# Patient Record
Sex: MALE | Race: WHITE | NOT HISPANIC OR LATINO | URBAN - METROPOLITAN AREA
[De-identification: names, ages, dates, MRNs, and addresses within clinical notes are randomized per-mention and may not be internally consistent; named-entity substitution may affect disease eponyms.]

---

## 2019-02-02 ENCOUNTER — INPATIENT (INPATIENT)
Facility: HOSPITAL | Age: 34
LOS: 0 days | Discharge: HOME | End: 2019-02-03
Attending: SURGERY | Admitting: SURGERY
Payer: COMMERCIAL

## 2019-02-02 VITALS
TEMPERATURE: 98 F | SYSTOLIC BLOOD PRESSURE: 141 MMHG | HEART RATE: 78 BPM | DIASTOLIC BLOOD PRESSURE: 91 MMHG | OXYGEN SATURATION: 96 % | RESPIRATION RATE: 18 BRPM

## 2019-02-02 LAB
ALBUMIN SERPL ELPH-MCNC: 4.3 G/DL — SIGNIFICANT CHANGE UP (ref 3.5–5.2)
ALLERGY+IMMUNOLOGY DIAG STUDY NOTE: SIGNIFICANT CHANGE UP
ALP SERPL-CCNC: 66 U/L — SIGNIFICANT CHANGE UP (ref 30–115)
ALT FLD-CCNC: 50 U/L — HIGH (ref 0–41)
ANION GAP SERPL CALC-SCNC: 15 MMOL/L — HIGH (ref 7–14)
APTT BLD: 32.7 SEC — SIGNIFICANT CHANGE UP (ref 27–39.2)
AST SERPL-CCNC: 38 U/L — SIGNIFICANT CHANGE UP (ref 0–41)
BASOPHILS # BLD AUTO: 0.06 K/UL — SIGNIFICANT CHANGE UP (ref 0–0.2)
BASOPHILS NFR BLD AUTO: 0.4 % — SIGNIFICANT CHANGE UP (ref 0–1)
BILIRUB SERPL-MCNC: 0.3 MG/DL — SIGNIFICANT CHANGE UP (ref 0.2–1.2)
BUN SERPL-MCNC: 23 MG/DL — HIGH (ref 10–20)
CALCIUM SERPL-MCNC: 9.4 MG/DL — SIGNIFICANT CHANGE UP (ref 8.5–10.1)
CHLORIDE SERPL-SCNC: 95 MMOL/L — LOW (ref 98–110)
CO2 SERPL-SCNC: 26 MMOL/L — SIGNIFICANT CHANGE UP (ref 17–32)
CREAT SERPL-MCNC: 1.4 MG/DL — SIGNIFICANT CHANGE UP (ref 0.7–1.5)
EOSINOPHIL # BLD AUTO: 0.26 K/UL — SIGNIFICANT CHANGE UP (ref 0–0.7)
EOSINOPHIL NFR BLD AUTO: 1.6 % — SIGNIFICANT CHANGE UP (ref 0–8)
GLUCOSE SERPL-MCNC: 139 MG/DL — HIGH (ref 70–99)
HCT VFR BLD CALC: 49.7 % — SIGNIFICANT CHANGE UP (ref 42–52)
HGB BLD-MCNC: 16.5 G/DL — SIGNIFICANT CHANGE UP (ref 14–18)
IMM GRANULOCYTES NFR BLD AUTO: 0.5 % — HIGH (ref 0.1–0.3)
INR BLD: 1.12 RATIO — SIGNIFICANT CHANGE UP (ref 0.65–1.3)
LIDOCAIN IGE QN: 25 U/L — SIGNIFICANT CHANGE UP (ref 7–60)
LYMPHOCYTES # BLD AUTO: 18.7 % — LOW (ref 20.5–51.1)
LYMPHOCYTES # BLD AUTO: 2.99 K/UL — SIGNIFICANT CHANGE UP (ref 1.2–3.4)
MCHC RBC-ENTMCNC: 28 PG — SIGNIFICANT CHANGE UP (ref 27–31)
MCHC RBC-ENTMCNC: 33.2 G/DL — SIGNIFICANT CHANGE UP (ref 32–37)
MCV RBC AUTO: 84.4 FL — SIGNIFICANT CHANGE UP (ref 80–94)
MONOCYTES # BLD AUTO: 0.88 K/UL — HIGH (ref 0.1–0.6)
MONOCYTES NFR BLD AUTO: 5.5 % — SIGNIFICANT CHANGE UP (ref 1.7–9.3)
NEUTROPHILS # BLD AUTO: 11.73 K/UL — HIGH (ref 1.4–6.5)
NEUTROPHILS NFR BLD AUTO: 73.3 % — SIGNIFICANT CHANGE UP (ref 42.2–75.2)
NRBC # BLD: 0 /100 WBCS — SIGNIFICANT CHANGE UP (ref 0–0)
PLATELET # BLD AUTO: 302 K/UL — SIGNIFICANT CHANGE UP (ref 130–400)
POTASSIUM SERPL-MCNC: 4.2 MMOL/L — SIGNIFICANT CHANGE UP (ref 3.5–5)
POTASSIUM SERPL-SCNC: 4.2 MMOL/L — SIGNIFICANT CHANGE UP (ref 3.5–5)
PROT SERPL-MCNC: 7 G/DL — SIGNIFICANT CHANGE UP (ref 6–8)
PROTHROM AB SERPL-ACNC: 12.9 SEC — HIGH (ref 9.95–12.87)
RBC # BLD: 5.89 M/UL — SIGNIFICANT CHANGE UP (ref 4.7–6.1)
RBC # FLD: 14.6 % — HIGH (ref 11.5–14.5)
SODIUM SERPL-SCNC: 136 MMOL/L — SIGNIFICANT CHANGE UP (ref 135–146)
TYPE + AB SCN PNL BLD: SIGNIFICANT CHANGE UP
WBC # BLD: 16 K/UL — HIGH (ref 4.8–10.8)
WBC # FLD AUTO: 16 K/UL — HIGH (ref 4.8–10.8)

## 2019-02-02 RX ORDER — BUPROPION HYDROCHLORIDE 150 MG/1
1 TABLET, EXTENDED RELEASE ORAL
Qty: 0 | Refills: 0 | COMMUNITY

## 2019-02-02 RX ORDER — IOHEXOL 300 MG/ML
30 INJECTION, SOLUTION INTRAVENOUS ONCE
Qty: 0 | Refills: 0 | Status: COMPLETED | OUTPATIENT
Start: 2019-02-02 | End: 2019-02-02

## 2019-02-02 RX ORDER — ESCITALOPRAM OXALATE 10 MG/1
1 TABLET, FILM COATED ORAL
Qty: 0 | Refills: 0 | COMMUNITY

## 2019-02-02 RX ORDER — BUPROPION HYDROCHLORIDE 150 MG/1
0 TABLET, EXTENDED RELEASE ORAL
Qty: 30 | Refills: 0 | COMMUNITY

## 2019-02-02 RX ORDER — CEFOTETAN DISODIUM 1 G
1 VIAL (EA) INJECTION ONCE
Qty: 0 | Refills: 0 | Status: COMPLETED | OUTPATIENT
Start: 2019-02-02 | End: 2019-02-02

## 2019-02-02 RX ORDER — ESCITALOPRAM OXALATE 10 MG/1
0 TABLET, FILM COATED ORAL
Qty: 30 | Refills: 0 | COMMUNITY

## 2019-02-02 RX ADMIN — Medication 100 GRAM(S): at 22:57

## 2019-02-02 RX ADMIN — IOHEXOL 30 MILLILITER(S): 300 INJECTION, SOLUTION INTRAVENOUS at 18:46

## 2019-02-02 NOTE — H&P ADULT - HISTORY OF PRESENT ILLNESS
32 y/o M no PMHx on Lexapro and Wellbutrin presents with abdominal pain starting 5PM yesterday. Pt reports pain was initially generalized, but now localized to RLQ. Pt reports nausea secondary to pain. Last BM 2PM today. Decreased appetite, normal PO intake. Pt has been taking Aleve with minimal relief.  No vomiting. No fever.  No urinary SX. 34 y/o M no PMHx of anxiety on Lexapro and Wellbutrin presents with abdominal pain starting 5PM yesterday. Pt reports pain was initially generalized, but now localized to RLQ. Pt reports nausea secondary to pain. Last BM 2PM today. Decreased appetite, normal PO intake. Pt has been taking Aleve with minimal relief.  No vomiting. No fever.  No urinary SX. 32 y/o male with PMHx of anxiety presents to ED c/o of abdominal pain x 1day. States that the pain was initially diffuse and then went to the RLQ. Denies any fever, chills, nausea, vomiting, diarrhea. States that last time he ate was 8 hours ago. Last BM 2PM today.

## 2019-02-02 NOTE — H&P ADULT - ATTENDING COMMENTS
Patient seen and examined in preoperative area  and discussed management plans with patient and explained..

## 2019-02-02 NOTE — H&P ADULT - ASSESSMENT
34 y/o male with PMHx of anxiety presents to ED c/o of abdominal pain x 1day. State that the pain was initially diffuse and then went to the RLQ. Deinies any fever, chills, nausea, vomiting, diarrhea. States that last time he ate was 8 hours ago.   On physical exam there is RLQ tenderness. WBC 16. CT abdomen shows Dilated appendix to 1.7 cm    Plan:   NPO, IVF,   Cefotetan  OR for lap appendectomy  DVT/GI prophylaxis 34 y/o male with PMHx of anxiety presents to ED c/o of abdominal pain x 1day. State that the pain was initially diffuse and then went to the RLQ. Denies any fever, chills, nausea, vomiting, diarrhea. States that last time he ate was 8 hours ago.   On physical exam there is RLQ tenderness. WBC 16. CT abdomen shows Dilated appendix to 1.7 cm    Plan:   NPO, IVF,   Cefotetan  OR for lap appendectomy  DVT / GI prophylaxis      Senior Resident Note  Pt seen and examined, RLQ pain, tenderness, rebound no guarding WBC 16, acute appendicitis on CT scan.   OR for lap appendectomy  Above note has been reviewed and edited  Plan d/w patient and Dr. Brasher 32 y/o male with PMHx of anxiety presents to ED c/o of abdominal pain x 1day. State that the pain was initially diffuse and then went to the RLQ. Denies any fever, chills, nausea, vomiting, diarrhea. States that last time he ate was 8 hours ago.   On physical exam there is RLQ tenderness. WBC 16. CT abdomen shows Dilated appendix to 1.7 cm consistent with acute appendicitis    Plan:   NPO, IVF,   Cefotetan  OR for lap appendectomy  DVT / GI prophylaxis      Senior Resident Note  Pt seen and examined, RLQ pain, tenderness, rebound no guarding WBC 16, acute appendicitis on CT scan.   OR for lap appendectomy  Above note has been reviewed and edited  Plan d/w patient and Dr. Brasher

## 2019-02-02 NOTE — H&P ADULT - NSHPPHYSICALEXAM_GEN_ALL_CORE
PHYSICAL EXAM:  GENERAL: NAD, well-developed  EYES: conjunctiva and sclera clear  CHEST/LUNG: Clear to auscultation bilaterally;   HEART: Regular rate and rhythm;   ABDOMEN: RLQ abdominal tenderness. Soft, Nondistended;   EXTREMITIES: No clubbing, cyanosis, or edema  PSYCH: AAOx3  NEUROLOGY: non-focal deficits  SKIN: No rashes or lesions Vitals:   T(C): 36.8 (02-02-19 @ 18:03), Max: 36.8 (02-02-19 @ 18:03)  HR: 87 (02-02-19 @ 18:03) (78 - 87)  BP: 141/92 (02-02-19 @ 18:03) (141/91 - 141/92)  RR: 20 (02-02-19 @ 18:03) (18 - 20)  SpO2: 95% (02-02-19 @ 18:03) (95% - 96%)    Height (cm): 162.56 (02-02 @ 16:37)  Weight (kg): 79.4 (02-02 @ 16:37)  BMI (kg/m2): 30 (02-02 @ 16:37)  BSA (m2): 1.85 (02-02 @ 16:37)    PHYSICAL EXAM:  GENERAL: NAD, well-developed  EYES: conjunctiva and sclera clear  CHEST/LUNG: Clear to auscultation bilaterally;   HEART: Regular rate and rhythm;   ABDOMEN: RLQ abdominal tenderness. Soft, Nondistended;   EXTREMITIES: No clubbing, cyanosis, or edema  PSYCH: AAOx3  NEUROLOGY: non-focal deficits  SKIN: No rashes or lesions

## 2019-02-02 NOTE — ED PROVIDER NOTE - OBJECTIVE STATEMENT
32 y/o M no PMHx on Lexapro and Wellbutrin presents with abdominal pain starting 5PM yesterday. [facility] and was sent to ED to r/o appendicitis. Pt reports pain was initially generalized, but now localized to RLQ. Pt had kidney stone in past but reports pain feels different. Pt reports nausea secondary to pain. Last BM 2PM today. Decreased appetite, normal PO intake. Pt has been taking Aleve with minimal relief.  No vomiting. No fever.  No urinary SX.

## 2019-02-02 NOTE — H&P ADULT - NSHPLABSRESULTS_GEN_ALL_CORE
LABS  CBC (02-02 @ 18:35)                              16.5                           16.00<H>  )----------------(  302        73.3  % Neutrophils, 18.7<L>% Lymphocytes, ANC: 11.73<H>                              49.7      BMP (02-02 @ 18:35)             136     |  95<L>   |  23<H> 		Ca++ --      Ca 9.4                ---------------------------------( 139<H>		Mg --                 4.2     |  26      |  1.4   			Ph --        LFTs (02-02 @ 18:35)      TPro 7.0 / Alb 4.3 / TBili 0.3 / DBili -- / AST 38 / ALT 50<H> / AlkPhos 66    Coags (02-02 @ 18:35)  aPTT 32.7 / INR 1.12 / PT 12.90<H>    IMAGING:   CT Abdomen and Pelvis w/ Oral Cont and w/ IV Cont (02.02.19 @ 22:09) >  IMPRESSION:   Acute appendicitis without evidence for perforation or abscess formation.

## 2019-02-02 NOTE — ED PROVIDER NOTE - PHYSICAL EXAMINATION
VITAL SIGNS: I have reviewed nursing notes and confirm.  CONSTITUTIONAL: Well-developed; well-nourished; in no acute distress.  SKIN: Skin exam is warm and dry, no acute rash.  HEAD: Normocephalic; atraumatic.  EYES: PERRL, EOM intact; conjunctiva and sclera clear.  ENT: No nasal discharge; airway clear. TMs clear.  NECK: Supple; non tender.  CARD: S1, S2 normal; no murmurs, gallops, or rubs. Regular rate and rhythm.  RESP: No wheezes, rales or rhonchi.  ABD: (+) tenderness RLQ. No rebound or guarding. No flank tenderness   EXT: Normal ROM. No clubbing, cyanosis or edema.  LYMPH: No acute cervical adenopathy.  NEURO: Alert, oriented. Grossly unremarkable. No focal deficits.  PSYCH: Cooperative, appropriate.

## 2019-02-02 NOTE — ED ADULT NURSE NOTE - OBJECTIVE STATEMENT
patient c/o generalized abdominal pain that started yesterday and today the pain moved to his RLQ. went to an urgent care that told him to come to ED to r/o appendicitis. denies n/v/d/fevers/chills. states he took a lot of Aleve today for the pain but it did not help

## 2019-02-03 ENCOUNTER — TRANSCRIPTION ENCOUNTER (OUTPATIENT)
Age: 34
End: 2019-02-03

## 2019-02-03 VITALS
TEMPERATURE: 98 F | RESPIRATION RATE: 19 BRPM | DIASTOLIC BLOOD PRESSURE: 58 MMHG | SYSTOLIC BLOOD PRESSURE: 121 MMHG | HEART RATE: 98 BPM | OXYGEN SATURATION: 98 %

## 2019-02-03 PROCEDURE — 44970 LAPAROSCOPY APPENDECTOMY: CPT

## 2019-02-03 PROCEDURE — 99222 1ST HOSP IP/OBS MODERATE 55: CPT | Mod: 57

## 2019-02-03 RX ORDER — CHLORHEXIDINE GLUCONATE 213 G/1000ML
1 SOLUTION TOPICAL
Qty: 0 | Refills: 0 | Status: DISCONTINUED | OUTPATIENT
Start: 2019-02-03 | End: 2019-02-03

## 2019-02-03 RX ORDER — ACETAMINOPHEN 500 MG
2 TABLET ORAL
Qty: 0 | Refills: 0 | COMMUNITY
Start: 2019-02-03

## 2019-02-03 RX ORDER — SODIUM CHLORIDE 9 MG/ML
1000 INJECTION INTRAMUSCULAR; INTRAVENOUS; SUBCUTANEOUS
Qty: 0 | Refills: 0 | Status: DISCONTINUED | OUTPATIENT
Start: 2019-02-03 | End: 2019-02-03

## 2019-02-03 RX ORDER — HYDROMORPHONE HYDROCHLORIDE 2 MG/ML
1 INJECTION INTRAMUSCULAR; INTRAVENOUS; SUBCUTANEOUS
Qty: 0 | Refills: 0 | Status: DISCONTINUED | OUTPATIENT
Start: 2019-02-03 | End: 2019-02-03

## 2019-02-03 RX ORDER — IBUPROFEN 200 MG
1 TABLET ORAL
Qty: 0 | Refills: 0 | COMMUNITY
Start: 2019-02-03

## 2019-02-03 RX ORDER — MORPHINE SULFATE 50 MG/1
2 CAPSULE, EXTENDED RELEASE ORAL EVERY 4 HOURS
Qty: 0 | Refills: 0 | Status: DISCONTINUED | OUTPATIENT
Start: 2019-02-03 | End: 2019-02-03

## 2019-02-03 RX ORDER — MORPHINE SULFATE 50 MG/1
2 CAPSULE, EXTENDED RELEASE ORAL EVERY 6 HOURS
Qty: 0 | Refills: 0 | Status: DISCONTINUED | OUTPATIENT
Start: 2019-02-03 | End: 2019-02-03

## 2019-02-03 RX ORDER — HEPARIN SODIUM 5000 [USP'U]/ML
5000 INJECTION INTRAVENOUS; SUBCUTANEOUS EVERY 8 HOURS
Qty: 0 | Refills: 0 | Status: DISCONTINUED | OUTPATIENT
Start: 2019-02-03 | End: 2019-02-03

## 2019-02-03 RX ORDER — SODIUM CHLORIDE 9 MG/ML
1000 INJECTION, SOLUTION INTRAVENOUS
Qty: 0 | Refills: 0 | Status: CANCELLED | OUTPATIENT
Start: 2019-02-03 | End: 2019-02-03

## 2019-02-03 RX ORDER — CEFOTETAN DISODIUM 1 G
2 VIAL (EA) INJECTION EVERY 12 HOURS
Qty: 0 | Refills: 0 | Status: DISCONTINUED | OUTPATIENT
Start: 2019-02-03 | End: 2019-02-03

## 2019-02-03 RX ORDER — BUPROPION HYDROCHLORIDE 150 MG/1
300 TABLET, EXTENDED RELEASE ORAL DAILY
Qty: 0 | Refills: 0 | Status: DISCONTINUED | OUTPATIENT
Start: 2019-02-03 | End: 2019-02-03

## 2019-02-03 RX ORDER — ESCITALOPRAM OXALATE 10 MG/1
10 TABLET, FILM COATED ORAL DAILY
Qty: 0 | Refills: 0 | Status: DISCONTINUED | OUTPATIENT
Start: 2019-02-03 | End: 2019-02-03

## 2019-02-03 RX ORDER — PANTOPRAZOLE SODIUM 20 MG/1
40 TABLET, DELAYED RELEASE ORAL
Qty: 0 | Refills: 0 | Status: DISCONTINUED | OUTPATIENT
Start: 2019-02-03 | End: 2019-02-03

## 2019-02-03 RX ORDER — IBUPROFEN 200 MG
600 TABLET ORAL EVERY 6 HOURS
Qty: 0 | Refills: 0 | Status: DISCONTINUED | OUTPATIENT
Start: 2019-02-03 | End: 2019-02-03

## 2019-02-03 RX ORDER — HYDROMORPHONE HYDROCHLORIDE 2 MG/ML
0.5 INJECTION INTRAMUSCULAR; INTRAVENOUS; SUBCUTANEOUS
Qty: 0 | Refills: 0 | Status: DISCONTINUED | OUTPATIENT
Start: 2019-02-03 | End: 2019-02-03

## 2019-02-03 RX ORDER — ACETAMINOPHEN 500 MG
650 TABLET ORAL EVERY 6 HOURS
Qty: 0 | Refills: 0 | Status: DISCONTINUED | OUTPATIENT
Start: 2019-02-03 | End: 2019-02-03

## 2019-02-03 RX ORDER — OXYCODONE HYDROCHLORIDE 5 MG/1
1 TABLET ORAL
Qty: 5 | Refills: 0 | OUTPATIENT
Start: 2019-02-03

## 2019-02-03 RX ADMIN — SODIUM CHLORIDE 100 MILLILITER(S): 9 INJECTION INTRAMUSCULAR; INTRAVENOUS; SUBCUTANEOUS at 00:53

## 2019-02-03 RX ADMIN — Medication 600 MILLIGRAM(S): at 11:55

## 2019-02-03 RX ADMIN — SODIUM CHLORIDE 100 MILLILITER(S): 9 INJECTION INTRAMUSCULAR; INTRAVENOUS; SUBCUTANEOUS at 07:45

## 2019-02-03 NOTE — BRIEF OPERATIVE NOTE - POST-OP DX
Acute appendicitis with localized peritonitis, without perforation, abscess, or gangrene  02/03/2019    Active  Beatrice Brasher

## 2019-02-03 NOTE — PROGRESS NOTE ADULT - SUBJECTIVE AND OBJECTIVE BOX
BRANDON BEHRING  33y Male   [id number]    Procedure/dx: acute appendicitis  Events of the Last 24h: admitted with acute appendicitis    Patient is a 33y old  Male who presents with a chief complaint of acute appendicitis (02 Feb 2019 23:38)    PAST MEDICAL & SURGICAL HISTORY:  Anxiety  No significant past surgical history    Vital Signs Last 24 Hrs  T(C): 36.8 (03 Feb 2019 03:07), Max: 36.8 (02 Feb 2019 18:03)  T(F): 98.2 (03 Feb 2019 03:07), Max: 98.2 (02 Feb 2019 18:03)  HR: 78 (03 Feb 2019 03:07) (78 - 87)  BP: 124/67 (03 Feb 2019 03:07) (124/67 - 145/87)  RR: 18 (03 Feb 2019 03:07) (18 - 20)  SpO2: 96% (03 Feb 2019 03:07) (95% - 96%)    Diet, NPO:   Except Medications (02-03-19 @ 00:45)  I&O's Detail    MEDICATIONS  (STANDING):  buPROPion  milliGRAM(s) Oral daily  chlorhexidine 4% Liquid 1 Application(s) Topical <User Schedule>  escitalopram 10 milliGRAM(s) Oral daily  heparin  Injectable 5000 Unit(s) SubCutaneous every 8 hours  pantoprazole    Tablet 40 milliGRAM(s) Oral before breakfast  sodium chloride 0.9%. 1000 milliLiter(s) (100 mL/Hr) IV Continuous <Continuous>    MEDICATIONS  (PRN):  morphine  - Injectable 2 milliGRAM(s) IV Push every 6 hours PRN Moderate Pain (4 - 6)    PHYSICAL EXAM:  GENERAL: NAD, well-developed  	EYES: conjunctiva and sclera clear  	CHEST/LUNG: Clear to auscultation bilaterally;   	HEART: Regular rate and rhythm;   	ABDOMEN: RLQ abdominal tenderness. Soft, Nondistended;   	EXTREMITIES: No clubbing, cyanosis, or edema  	PSYCH: AAOx3  	NEUROLOGY: non-focal deficits  SKIN: No rashes or lesions    LABS:                    16.5   16.00 )-----------( 302      ( 02 Feb 2019 18:35 )             49.7        02-02    136  |  95<L>  |  23<H>  ----------------------------<  139<H>  4.2   |  26  |  1.4    Ca    9.4      02 Feb 2019 18:35    TPro  7.0  /  Alb  4.3  /  TBili  0.3  /  DBili  x   /  AST  38  /  ALT  50<H>  /  AlkPhos  66  02-02    LIVER FUNCTIONS - ( 02 Feb 2019 18:35 )  Alb: 4.3 g/dL / Pro: 7.0 g/dL / ALK PHOS: 66 U/L / ALT: 50 U/L / AST: 38 U/L / GGT: x           PT/INR - ( 02 Feb 2019 18:35 )   PT: 12.90 sec;   INR: 1.12 ratio         PTT - ( 02 Feb 2019 18:35 )  PTT:32.7 sec    IMAGING:  CT Abdomen and Pelvis w/ Oral Cont and w/ IV Cont (02.02.19 @ 22:09) >  IMPRESSION:   Acute appendicitis without evidence for perforation or abscess formation.

## 2019-02-03 NOTE — CHART NOTE - NSCHARTNOTEFT_GEN_A_CORE
PACU ANESTHESIA ADMISSION NOTE      Procedure:   Post op diagnosis:  Acute appendicitis with localized peritonitis, without perforation, abscess, or gangrene      ____  Intubated  TV:______       Rate: ______      FiO2: ______    __x__  Patent Airway    __x__  Full return of protective reflexes    ____  Full recovery from anesthesia / back to baseline     Vitals:   T:  98.8F         R:    12              BP:   146/83              Sat: 98%                  P: 94      Mental Status:  _x___ Awake   _____ Alert   _____ Drowsy   _____ Sedated    Nausea/Vomiting:  __x__ NO  ______Yes,   See Post - Op Orders          Pain Scale (0-10):  ___0__    Treatment: ____ None    ____ See Post - Op/PCA Orders    Post - Operative Fluids:   ____ Oral   __x__ See Post - Op Orders    Plan: Discharge:   ____Home       __x___Floor     _____Critical Care    _____  Other:_________________    Comments: Pt awake, VS stable, no anesthesia complications.

## 2019-02-03 NOTE — PROGRESS NOTE ADULT - ASSESSMENT
32 y/o male with PMHx of anxiety presents to ED c/o of abdominal pain x 1day. State that the pain was initially diffuse and then went to the RLQ. Denies any fever, chills, nausea, vomiting, diarrhea. States that last time he ate was 8 hours ago.   On physical exam there is RLQ tenderness. WBC 16. CT abdomen shows Dilated appendix to 1.7 cm consistent with acute appendicitis    Plan:   NPO, IVF,   Cefotetan  OR for lap appendectomy  DVT / GI prophylaxis

## 2019-02-03 NOTE — DISCHARGE NOTE ADULT - PATIENT PORTAL LINK FT
You can access the [facility], offered by [facility], by registering with the following website: [email or web address]

## 2019-02-03 NOTE — DISCHARGE NOTE ADULT - MEDICATION SUMMARY - MEDICATIONS TO TAKE
I will START or STAY ON the medications listed below when I get home from the hospital:    acetaminophen 325 mg oral tablet  -- 2 tab(s) by mouth every 6 hours, As needed, Mild Pain (1 - 3), Moderate Pain (4 - 6)  -- Indication: For pain     ibuprofen 600 mg oral tablet  -- 1 tab(s) by mouth every 6 hours  -- Indication: For pain    oxyCODONE 5 mg oral tablet  -- 1 tab(s) by mouth every 6 hours, As Needed -for moderate pain MDD:MDD=4   -- Caution federal law prohibits the transfer of this drug to any person other  than the person for whom it was prescribed.  It is very important that you take or use this exactly as directed.  Do not skip doses or discontinue unless directed by your doctor.  May cause drowsiness.  Alcohol may intensify this effect.  Use care when operating dangerous machinery.  This prescription cannot be refilled.  Using more of this medication than prescribed may cause serious breathing problems.    -- Indication: For moderate pain    ESCITALOPRAM TAB 10MG  -- 1 tab(s) by mouth once a day  -- Indication: For Anxiety    BUPROPN HCL  TAB 300MG XL  -- 1 tab(s) by mouth once a day  -- Indication: For Anxiety

## 2019-02-03 NOTE — DISCHARGE NOTE ADULT - CARE PROVIDER_API CALL
Beatrice Brasher)  Surgery  [address] [place] [address]  Phone: [phone]  Fax: [phone]

## 2019-02-03 NOTE — DISCHARGE NOTE ADULT - CARE PLAN
Principal Discharge DX:	Appendicitis  Goal:	Complete recovery expected  Assessment and plan of treatment:	Please take tylenol 650 mg and ibuprofen 600 mg every 6 hours with meals for pain, you may take 1 tab oxycodone every 4-6 hours for severe pain, a prescription has been sent to your pharmacy.  Please do not drive if you are taking pain medication.  You may continue regular diet as tolerated.  You may shower, please keep dressings and wound clean and dry, please do no soak wound or dressings.  You may remove the dressings in 48 hours.  Please follow up with Dr. Brasher in 1-2 weeks, you may call [phone] to make an appointment.  Please refrain from heavy lifting or straining >15lbs for the next 4-6 weeks.  If you experience fever, chills, chest pain, shortness of breath, nausea, vomiting, worsening abdominal pain please return to the ED for further management.

## 2019-02-03 NOTE — DISCHARGE NOTE ADULT - HOSPITAL COURSE
34 y/o male with PMHx of anxiety presents to ED c/o of abdominal pain x 1day. States that the pain was initially diffuse and then went to the RLQ. Denies any fever, chills, nausea, vomiting, diarrhea. States that last time he ate was 8 hours ago. Last BM 2PM today.  CT A/P showed acute appendicitis without evidence of perforation or abscess.  Patient was taken to the OR for laparoscopic appendectomy, patient tolerated procedure well, remained stable throughout the procedure, was transferred to the PACU for further monitoring.  Patient remained stable in the PACU, patient was transferred to the floor for further monitoring.  Patient tolerated regular diet, was ambulating with no issues, pain was controlled, and patient voided with no issues.  Patient will follow up outpatient with Dr. Brasher in 1-2 weeks.

## 2019-02-03 NOTE — DISCHARGE NOTE ADULT - ADDITIONAL INSTRUCTIONS
Please take tylenol 650 mg and ibuprofen 600 mg every 6 hours with meals for pain, you may take 1 tab oxycodone every 4-6 hours for severe pain, a prescription has been sent to your pharmacy.  Please do not drive if you are taking pain medication.  You may continue regular diet as tolerated.  You may shower, please keep dressings and wound clean and dry, please do no soak wound or dressings.  You may remove the dressings in 48 hours.  Please follow up with Dr. Brasher in 1-2 weeks, you may call [phone] to make an appointment.  Please refrain from heavy lifting or straining >15lbs for the next 4-6 weeks.  If you experience fever, chills, chest pain, shortness of breath, nausea, vomiting, worsening abdominal pain please return to the ED for further management.

## 2019-02-03 NOTE — DISCHARGE NOTE ADULT - PLAN OF CARE
Complete recovery expected Please take tylenol 650 mg and ibuprofen 600 mg every 6 hours with meals for pain, you may take 1 tab oxycodone every 4-6 hours for severe pain, a prescription has been sent to your pharmacy.  Please do not drive if you are taking pain medication.  You may continue regular diet as tolerated.  You may shower, please keep dressings and wound clean and dry, please do no soak wound or dressings.  You may remove the dressings in 48 hours.  Please follow up with Dr. Brasher in 1-2 weeks, you may call [phone] to make an appointment.  Please refrain from heavy lifting or straining >15lbs for the next 4-6 weeks.  If you experience fever, chills, chest pain, shortness of breath, nausea, vomiting, worsening abdominal pain please return to the ED for further management.

## 2019-02-03 NOTE — BRIEF OPERATIVE NOTE - PROCEDURE
<<-----Click on this checkbox to enter Procedure Appendectomy, laparoscopic, adult  02/03/2019    Active  SRINI

## 2019-02-06 DIAGNOSIS — K35.80 UNSPECIFIED ACUTE APPENDICITIS: ICD-10-CM

## 2019-02-06 DIAGNOSIS — Z88.2 ALLERGY STATUS TO SULFONAMIDES: ICD-10-CM

## 2019-02-06 DIAGNOSIS — F41.9 ANXIETY DISORDER, UNSPECIFIED: ICD-10-CM

## 2019-02-06 DIAGNOSIS — R10.9 UNSPECIFIED ABDOMINAL PAIN: ICD-10-CM

## 2019-02-07 LAB — SURGICAL PATHOLOGY STUDY: SIGNIFICANT CHANGE UP

## 2019-02-11 PROBLEM — Z00.00 ENCOUNTER FOR PREVENTIVE HEALTH EXAMINATION: Status: ACTIVE | Noted: 2019-02-11

## 2019-02-11 PROBLEM — F41.9 ANXIETY DISORDER, UNSPECIFIED: Chronic | Status: ACTIVE | Noted: 2019-02-02

## 2019-02-19 ENCOUNTER — APPOINTMENT (OUTPATIENT)
Dept: SURGERY | Facility: CLINIC | Age: 34
End: 2019-02-19
Payer: COMMERCIAL

## 2019-02-19 VITALS
WEIGHT: 180 LBS | SYSTOLIC BLOOD PRESSURE: 120 MMHG | BODY MASS INDEX: 31.89 KG/M2 | DIASTOLIC BLOOD PRESSURE: 76 MMHG | HEIGHT: 63 IN

## 2019-02-19 DIAGNOSIS — F32.9 MAJOR DEPRESSIVE DISORDER, SINGLE EPISODE, UNSPECIFIED: ICD-10-CM

## 2019-02-19 DIAGNOSIS — Z78.9 OTHER SPECIFIED HEALTH STATUS: ICD-10-CM

## 2019-02-19 DIAGNOSIS — K35.80 UNSPECIFIED ACUTE APPENDICITIS: ICD-10-CM

## 2019-02-19 PROCEDURE — 99024 POSTOP FOLLOW-UP VISIT: CPT

## 2019-02-19 RX ORDER — BUPROPION HYDROCHLORIDE 150 MG/1
150 TABLET, EXTENDED RELEASE ORAL
Refills: 0 | Status: ACTIVE | COMMUNITY

## 2019-02-19 RX ORDER — ESCITALOPRAM OXALATE 10 MG/1
10 TABLET, FILM COATED ORAL
Refills: 0 | Status: ACTIVE | COMMUNITY

## 2019-02-19 NOTE — HISTORY OF PRESENT ILLNESS
[de-identified] : This 33-year-old male was hospitalized at [facility] acute abdominal pain and right lower quadrant tenderness and CT scan demonstrated acute appendicitis. Patient underwent uneventful laparoscopic appendectomy and was discharged. Patient now returns for followup [de-identified] : Doing well since discharge has returned to work

## 2019-02-19 NOTE — DATA REVIEWED
[FreeTextEntry1] : Pathology report was reviewed the patient acute appendicitis and periappendicitis

## 2019-02-19 NOTE — ASSESSMENT
[FreeTextEntry1] : 33-year-old male status post laparoscopic appendectomy doing well and has returned to work up no complaints. Pathologic report was reviewed and the patient. Patient was to return to activity as tolerated and return to the office for any problems

## 2019-02-19 NOTE — PHYSICAL EXAM
[Abdominal Masses] : No abdominal masses [Abdomen Tenderness] : ~T ~M No abdominal tenderness [de-identified] : Young healthy looking male ambulatory in no discomfort [de-identified] : Abdomen soft nondistended nontender healing laparoscopic appendectomy scars without any evidence of infection

## 2020-01-30 NOTE — PATIENT PROFILE ADULT - NSPROMEDSBROUGHTTOHOSP_GEN_A_NUR
Medications as prescribed.  If no improvement after 3 to 4 weeks allergy specialist evaluation for allergy testing.  Continue other maintenance medications.  Schedule fasting blood work at your convenience.  Schedule Medicare wellness visit as well.  
no

## 2023-01-11 NOTE — REASON FOR VISIT
[Post Op: _________] : a [unfilled] post op visit [Source: ______] : History obtained from [unfilled] Doctor's office